# Patient Record
Sex: FEMALE | Race: WHITE
[De-identification: names, ages, dates, MRNs, and addresses within clinical notes are randomized per-mention and may not be internally consistent; named-entity substitution may affect disease eponyms.]

---

## 2018-06-01 ENCOUNTER — HOSPITAL ENCOUNTER (EMERGENCY)
Dept: HOSPITAL 59 - ER | Age: 53
Discharge: HOME | End: 2018-06-01
Payer: SELF-PAY

## 2018-06-01 DIAGNOSIS — H00.032: ICD-10-CM

## 2018-06-01 DIAGNOSIS — H01.001: ICD-10-CM

## 2018-06-01 DIAGNOSIS — H00.035: ICD-10-CM

## 2018-06-01 DIAGNOSIS — H00.034: ICD-10-CM

## 2018-06-01 DIAGNOSIS — H01.002: ICD-10-CM

## 2018-06-01 DIAGNOSIS — H01.004: ICD-10-CM

## 2018-06-01 DIAGNOSIS — H00.031: Primary | ICD-10-CM

## 2018-06-01 DIAGNOSIS — H01.005: ICD-10-CM

## 2018-06-01 PROCEDURE — 99282 EMERGENCY DEPT VISIT SF MDM: CPT

## 2018-06-01 NOTE — EMERGENCY DEPARTMENT RECORD
History of Present Illness





- General


Chief complaint: Eye Problem


Stated complaint: RT EYE PROBLEM


Source: Patient


Mode of Arrival: Ambulatory


Limitations: No limitations





- History of Present Illness


Initial comments: 


53 yo female presents with irritation and redness around her right eye lids.  

She noted a possible mosquito bite on the upper lid yesterday.  Today she has 

redness and mild swelling of both lids.  No vision changes.  No drainage.  No 

itching.  No contact use.  The pain is the skin and not the eye itself.  No 

other current symptoms. 





MD chief complaint: Eye redness


-: Days(s) (2)


Onset Description: Gradual


Location: Right eye


Place: Home


If Injury: None


If Pain, Quality: Aching


Consistency: Constant


Associated Symptoms: None


Treatments Prior to Arrival: None





- Related Data


 Previous Rx's











 Medication  Instructions  Recorded


 


Cephalexin [Keflex] 500 mg PO TID #21 cap 06/01/18











 Allergies











Allergy/AdvReac Type Severity Reaction Status Date / Time


 


sulfamethoxazole Allergy  SWELLING Verified 06/01/18 21:33





[From Bactrim]   OF THE  





   TONGUE  


 


trimethoprim [From Bactrim] Allergy  SWELLING Verified 06/01/18 21:33





   OF THE  





   TONGUE  














Review of Systems


Constitutional: Denies: Chills, Fever, Malaise, Weakness


Eyes: Reports: Eye pain (lids).  Denies: Eye discharge, Photophobia, Vision 

change


ENT: Denies: Congestion, Ear pain, Epistaxis, Throat pain


Respiratory: Denies: Cough, Dyspnea


Cardiovascular: Denies: Chest pain


Endocrine: Denies: Fatigue


Gastrointestinal: Denies: Abdominal pain, Diarrhea, Nausea, Vomiting


Genitourinary: Denies: Dysuria, Urgency


Musculoskeletal: Denies: Arthralgia, Back pain, Myalgia


Skin: Denies: Bruising, Change in color, Rash


Neurological: Denies: Headache, Numbness, Weakness


Psychiatric: Denies: Anxiety


Hematological/Lymphatic: Denies: Blood Clots, Easy bleeding, Easy bruising





Physical Exam





- General


General Appearance: Alert, Oriented x3, Cooperative, No acute distress


Limitations: No limitations





- Head


Head exam: Normal inspection





- Eye


Eye exam: PERRL, EOMI, Periorbital swelling.  negative: Normal appearance, 

Conjunctival injection, Nystagmus, Periorbital tenderness, Scleral icterus


Pupils: Normal accommodation





- ENT


ENT exam: Normal exam, Mucous membranes moist, Normal orophraynx


Ear exam: Normal external inspection


Nasal Exam: Normal inspection


Mouth exam: Normal external inspection





- Neck


Neck exam: Normal inspection, Full ROM.  negative: Tenderness





- Respiratory


Respiratory exam: Normal lung sounds bilaterally.  negative: Respiratory 

distress, Rhonchi, Stridor, Wheezes





- Extremities


Extremities exam: Normal inspection





- Neurological


Neurological exam: Alert, Oriented X3





- Psychiatric


Psychiatric exam: Normal affect, Normal mood





- Skin


Skin exam: Erythema (eye lids)





Course





 Vital Signs











  06/01/18





  21:29


 


Temperature 99.2 F


 


Pulse Rate [ 91 H





Pulse Ox Probe] 


 


Respiratory 20





Rate 


 


Blood Pressure 179/93





[Left Arm] 


 


Pulse Ox 97














- Reevaluation(s)


Reevaluation #1: 





06/01/18 21:37


The redness and irritation are located on the skin of the upper and lower lids.

  The eye itself seems sparred.  She may have a mild local blepharitis/cellulits





Disposition


Disposition: Discharge


Clinical Impression: 


 Blepharitis





Cellulitis


Qualifiers:


 Site of cellulitis: unspecified site Qualified Code(s): L03.90 - Cellulitis, 

unspecified





Disposition: Home, Self-Care


Condition: (1) Good


Instructions:  Blepharitis (ED), Cellulitis (ED)


Additional Instructions: 


Continue to apply cold compresses or ice


Take the drops every 4 hours


Return or be seen if worse or any new concerns


Prescriptions: 


Cephalexin [Keflex] 500 mg PO TID #21 cap


Time of Disposition: 21:39





Quality





- Quality Measures


Quality Measures: N/A





- Blood Pressure Screening


Does Patient Have Any of the Following: No


Blood Pressure Classification: Hypertensive Reading


Systolic Measurement: 179


Diastolic Measurement: 93


Screening for High Blood Pressure: < Pre-Hypertensive BP, F/U Documented > [

]


Pre-Hypertensive Follow-up Interventions: Referral to alternative/primary care 

provider.